# Patient Record
Sex: MALE | Race: WHITE
[De-identification: names, ages, dates, MRNs, and addresses within clinical notes are randomized per-mention and may not be internally consistent; named-entity substitution may affect disease eponyms.]

---

## 2018-02-18 ENCOUNTER — HOSPITAL ENCOUNTER (EMERGENCY)
Dept: HOSPITAL 80 - FED | Age: 23
Discharge: HOME | End: 2018-02-18
Payer: COMMERCIAL

## 2018-02-18 VITALS
SYSTOLIC BLOOD PRESSURE: 150 MMHG | TEMPERATURE: 98.8 F | RESPIRATION RATE: 20 BRPM | HEART RATE: 102 BPM | DIASTOLIC BLOOD PRESSURE: 92 MMHG | OXYGEN SATURATION: 95 %

## 2018-02-18 DIAGNOSIS — S92.351A: Primary | ICD-10-CM
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
## 2018-02-18 PROCEDURE — L4386 NON-PNEUM WALK BOOT PRE CST: HCPCS

## 2018-02-18 NOTE — EDPHY
H & P


Time Seen by Provider: 02/18/18 19:17


HPI/ROS: 





CHIEF COMPLAINT:  Right ankle and right 5th metatarsal pain





HISTORY OF PRESENT ILLNESS:  22-year-old male arrives via private vehicle 

complaining of acute right lateral ankle and right 5th metatarsal pain after he 

was skateboarding and rolled his foot.  No fall from height.  Unable to bear 

weight.  No proximal tibia or fibula pain.  No paresthesia











************


PHYSICAL EXAM





(Prior to examination, patient consented to physical exam, hands were washed 

and my usual and customary physical exam procedures followed)


1) GENERAL: Well-developed, well-nourished, alert and oriented.  Appears to be 

in no acute distress.


2) HEAD: Normocephalic


3) HEENT: Pupils equal, round, reactive to light bilaterally.  


4) LUNGS: Breathing comfortably.   


5) MUSCULOSKELETAL:  Soft tissue swelling and tenderness to palpation lateral 

malleolus.  proximal tibia and fibula nontender  .5th MT is tender with no 

visible deformity.  Remainder foot nontender. negative [place] test, 

compartments soft


6) SKIN:  Intact no tenting


7) VASCULAR: DP,PT pulses and cap refill present and brisk








***************





DIFFERENTIAL DIAGNOSIS:   in no particular order including but not limited to 

fracture, sprain, compartment syndrome





********











Procedure:  Crutches


 indications for crutch use discussed with patient.  Patient fitted for 

crutches by ER staff.  Observed ambulating with crutches.  I think the patient 

has the capacity to safely use crutches.  Usual and customary crutch walking 

precautions provided





Procedure:  Splint 





A [place] boot splint was applied by ER technician.   After application of the 

splint I returned and re-examined the patient.  The splint was adequately 

immobilizing the joint and distal to the splint the patient's circulation and 

sensation were intact.  Patient shows no signs of compartment syndrome.  Was 

given orthopedic precautions.





Smoking Status: Never smoked


Constitutional: 


 Initial Vital Signs











Temperature (C)  37.1 C   02/18/18 19:12


 


Heart Rate  102 H  02/18/18 19:12


 


Respiratory Rate  20   02/18/18 19:12


 


Blood Pressure  150/92 H  02/18/18 19:12


 


O2 Sat (%)  95   02/18/18 19:12








 











O2 Delivery Mode               Room Air














Allergies/Adverse Reactions: 


 





No Known Allergies Allergy (Unverified 02/18/18 19:11)


 











MDM/Departure





- MDM


Imaging Results: 


 Imaging Impressions





Ankle X-Ray  02/18/18 19:18


Impression:


1. Small acute chip avulsion fracture from the lateral aspect of the cuboid.


 


 


2. Lateral soft tissue swelling.


 


 


3. Nonunion apophysis versus remote fracture proximal right fifth metatarsal.








Foot X-Ray  02/18/18 19:22


Impression:


1. Acute chip avulsion from the lateral aspect of the cuboid.


 


 


2. Nonunion apophysis versus remote fracture right fifth metatarsal.











Medications Given: 


 








Discontinued Medications





Ibuprofen (Motrin)  800 mg PO EDNOW ONE


   Stop: 02/18/18 19:23


   Last Admin: 02/18/18 19:38 Dose:  800 mg





ED Course/Re-evaluation: 





Care of patient under supervision of secondary supervising physician Dr Rogelio Morelos. 





- Depart


Disposition: Home, Routine, Self-Care


Clinical Impression: 


Fracture of 5th metatarsal


Qualifiers:


 Encounter type: initial encounter Fracture type: closed Fracture alignment: 

displaced Laterality: right Qualified Code(s): S92.351A - Displaced fracture of 

fifth metatarsal bone, right foot, initial encounter for closed fracture





Condition: Good


Instructions:  Foot Fracture in Adults (ED)


Additional Instructions: 


Return to the ER immediately if you experience discoloration, have worsening 

pain, numbness, tingling, or any other symptoms that concern you.  If you 

received x-rays in the emergency department today, be advised, that ligamentous

, tendon, muscular, and other non-bony injury cannot be fully ruled out. Try to 

keep your affected extremity elevated above the level of your chest, and keep 

cold packs on the affected area, for the next 48 hours.


Referrals: 


Richmond Nogueira MD [Medical Doctor] - 2-3 days, call for appt.